# Patient Record
(demographics unavailable — no encounter records)

---

## 2017-08-21 NOTE — REP
LEFT KNEE, FIVE VIEWS:

 

HISTORY:  Pain.

 

There is no acute fracture of subluxation.  An osteophyte is present on the

patella.  The knee joint space is normal in appearance.  A small suprapatellar

joint effusion is present.

 

IMPRESSION:

 

There is no acute fracture or dislocation.

 

 

Signed by

Esa Moreira MD 08/21/2017 03:18 P